# Patient Record
(demographics unavailable — no encounter records)

---

## 2025-01-10 NOTE — CARDIOLOGY SUMMARY
[Today's Date] : [unfilled] [FreeTextEntry1] : Sinus bradycardia, rate 57 bpm, QRS axis +82 degrees, MD 0.13, QRS 0.09, QTc 0.40 seconds; possible left ventricular hypertrophy. [FreeTextEntry2] : Summary:  1. Normal aortic valve morphology and systolic Doppler profile. 2. Trivial aortic valve regurgitation. 3. Normal left ventricular size, morphology and systolic function. 4. Normal right ventricular morphology with qualitatively normal size and systolic function. 5. No pericardial effusion. 6. There has been no significant interval change.  [de-identified] : Ordered [de-identified] : Lipid profile, LPA, CMP, CRP, homocysteine level, pulmonary consultation

## 2025-01-10 NOTE — ASSESSMENT
[FreeTextEntry1] : Andreas is a 12-year-old male, with 1-year hx of chest pain on exertion following COVID-19 infection. Pain has persisted only with exertion since patient last followed up in September 2024. DDx includes chest tightness secondary to exercised induced Asthma given patient hx of respiratory sx treated with Albuterol. Some cardiac etiologies of chest pain include pain secondary to inflammation, myositis, and cardiomyopathy. There is lower suspicion of cardiac etiology of pain given normal EKG's and Echos..

## 2025-01-10 NOTE — END OF VISIT
[Time Spent: ___ minutes] : I have spent [unfilled] minutes of time on the encounter which excludes teaching and separately reported services. [] : Resident [FreeTextEntry3] : Patient was seen with resident.  I performed a history and physical and discussion with family and resident.

## 2025-01-10 NOTE — PHYSICAL EXAM
[General Appearance - Alert] : alert [General Appearance - In No Acute Distress] : in no acute distress [General Appearance - Well Nourished] : well nourished [General Appearance - Well-Appearing] : well appearing [Attitude Uncooperative] : cooperative [Facies] : the head and face were normal in appearance [Sclera] : the conjunctiva were normal [EOMI] : ~T the extraocular movements were intact [PERRL With Normal Accommodation] : the pupils were equal in size, round, and reactive to light [Outer Ear] : the ears and nose were normal in appearance [Examination Of The Oral Cavity] : mucous membranes were moist and pink [Oropharynx] : the oropharynx was normal [No Cough] : no cough [Stridor] : no stridor was observed [Respiration, Rhythm And Depth] : normal respiratory rhythm and effort [Normal Chest Appearance] : the chest was normal in appearance [Chest Visual Inspection Thoracic Deformity] : no chest wall deformity [Chest Palpation Tender Sternum] : no chest wall tenderness [Heart Rate And Rhythm] : normal heart rate and rhythm [Heart Sounds] : normal S1 and S2 [No Murmur] : no murmurs  [Heart Sounds Gallop] : no gallops [Heart Sounds Pericardial Friction Rub] : no pericardial rub [Edema] : no edema [Arterial Pulses] : normal upper and lower extremity pulses with no pulse delay [Heart Sounds Click] : no clicks [Capillary Refill Test] : normal capillary refill [Bowel Sounds] : normal bowel sounds [Abdomen Soft] : soft [Nondistended] : nondistended [Abdomen Tenderness] : non-tender [Nail Clubbing] : no clubbing  or cyanosis of the fingernails [Musculoskeletal Exam: Normal Movement Of All Extremities] : normal movements of all extremities [Delayed Developmental Milestones] : normal neurologic development for age [Motor Tone] : normal muscle strength and tone [Abnormal Walk] : normal gait [] : no rash [Demonstrated Behavior - Infant Nonreactive To Parents] : interactive [Mood] : mood and affect were appropriate for age [Demonstrated Behavior] : normal behavior

## 2025-01-10 NOTE — DISCUSSION/SUMMARY
[May participate in all age-appropriate activities] : [unfilled] May participate in all age-appropriate activities. [Needs SBE Prophylaxis] : [unfilled] does not need bacterial endocarditis prophylaxis [FreeTextEntry1] : Blood work, exercise stress test with MVO 2; follow-up in 2 months as needed pulmonary consultation

## 2025-01-10 NOTE — CONSULT LETTER
[Today's Date] : [unfilled] [Dear  ___:] : Dear Dr. [unfilled]: [Name] : Name: [unfilled] [] : : ~~ [Today's Date:] : [unfilled] [Consult - Single Provider] : Thank you very much for allowing me to participate in the care of this patient. If you have any questions, please do not hesitate to contact me. [Sincerely,] : Sincerely, [___] : [unfilled] [FreeTextEntry4] : Dr Mccallum [FreeTextEntry5] : [address] [FreeTextEntry6] : [place] [address] [de-identified] : Jimmy Mcgrath MD, FAAP, FACC, FAHA Chief Emeritus, Division of Pediatric Cardiology [facility] Professor, Department of Pediatrics, [facility]

## 2025-02-08 NOTE — REASON FOR VISIT
[TextEntry] : Andreas Ariza is 12 years old male with history of seasonal allergies. From pulmonary and sleep perspective, he has history of exercise induced cough, shortness of breath, chest pain and dizziness. The patient is here for an initial evaluation. The history was obtained from the patient and father.

## 2025-02-08 NOTE — RESULTS/DATA
[TextEntry] : No chest imaging to review.   EK/10/2025. Sinus bradycardia, rate 57 bpm, QRS axis +82 degrees, IN 0.13, QRS 0.09, QTc 0.40 seconds; possible left ventricular hypertrophy.    Echo: 01/10/2025. Summary: 1. Normal aortic valve morphology and systolic Doppler profile. 2. Trivial aortic valve regurgitation. 3. Normal left ventricular size, morphology and systolic function. 4. Normal right ventricular morphology with qualitatively normal size and systolic function. 5. No pericardial effusion. 6. There has been no significant interval change.

## 2025-02-08 NOTE — IMPRESSION
[TextEntry] : Interpretation: Spirometry is normal. Flow volume loop revealed notch shoulder on the expiratory flow loop and flattening of inspiratory loop which could represent tracheomalacia and variable extrathoracic obstruction like vocal cord dysfunction, respectively. Bronchodilator response was not checked. Lung volumes, airway resistance and diffusion capacity are normal. There is no prior study to compare.

## 2025-02-08 NOTE — ASSESSMENT
[FreeTextEntry1] : Andreas Ariza is 12 years old male with history of seasonal allergies. From pulmonary and sleep perspective, he has history of exercise induced cough, shortness of breath, chest pain and dizziness.   The exact etiologyof his symptoms is unclear, could be exercise induced asthma. Howevre the flow volume loop revealed notch shoulder on the expiratory flow loop and flattening of inspiratory loop which could represent tracheomalacia and variable extrathoracic obstruction like vocal cord dysfunction, respectively.  Plan: I had discussed with the father. At this time, I would like to start pre activity albuterol for him. If he did not have a clinical response, I will discuss the need for an airway evaluation with the parents.  1. Start 2 puffs of albuterol 15-20 minutes before physical activity which will help to improve exercise compliance and will decrease the need for post activity albuterol. 2. I will order a chest x-ray. It is done on the 2nd floor of the [facility]. No appointment is needed. I will call you if it is abnormal.  3. Always use spacer with inhaler. MDI/spacer teaching was provided in the clinic.  4. I will see him in 3 months.

## 2025-02-08 NOTE — ASSESSMENT
[FreeTextEntry1] : Andreas Ariza is 12 years old male with history of seasonal allergies. From pulmonary and sleep perspective, he has history of exercise induced cough, shortness of breath, chest pain and dizziness.   The exact etiologyof his symptoms is unclear, could be exercise induced asthma. Howevre the flow volume loop revealed notch shoulder on the expiratory flow loop and flattening of inspiratory loop which could represent tracheomalacia and variable extrathoracic obstruction like vocal cord dysfunction, respectively.  Plan: I had discussed with the father. At this time, I would like to start pre activity albuterol for him. If he did not have a clinical response, I will discuss the need for an airway evaluation with the parents.  1. Start 2 puffs of albuterol 15-20 minutes before physical activity which will help to improve exercise compliance and will decrease the need for post activity albuterol. 2. I will order a chest x-ray. It is done on the 2nd floor of the [facility]. No appointment is needed. I will call you if it is abnormal.  3. Always use spacer with inhaler. MDI/spacer teaching was provided in the clinic.  4. I will see him in 3 months.

## 2025-02-08 NOTE — HISTORY OF PRESENT ILLNESS
[FreeTextEntry1] : Andreas has history of exercise induced chest pain, shortness of breath, cough and dizziness with the physical activity. He has these symptoms only when he does excessive physical activity. These symptoms are present for last 1 year and usually are not present with the regular sports. He denies any symptoms ta rest. He was recently seen by the cardiologist and he unlikely has cardiac etiology of his symptoms and his EKG and echo are normal.   RESPIRATORY HISTORY: There is no history of any pulmonary symptoms at birth. He has never been diagnosed with asthma. There is no history of feeding difficulties including cough or choking with feeding. There is no history of noisy breathing . There is no history of recurrent pneumonias.  ALLERGY SYMPTOMS: There is history of nasal and ocular allergy symptoms. There is history of itchy or watery eyes, itchy or watery nose, and chronic nasal congestion. The symptoms are more prominent during spring. The patient does use any antiallergic and/or nasal steroids.  SLEEP SYMPTOMS: There is no history of loud snoring, respiratory pauses during sleep, restless sleep, sleep onset and maintenance insomnia. The patient did not have a sleep study.  OTHER SYMPTOMS: The patient is feeding, growing and developing well. There is no history of reflux/GERD. There is no history of, FTT, developmental delay, seizure or neurological disorder.  NEONATOLOGY HISTORY: The patient was born at 39 weeks via . The patient's birth weight was 7lb. There were no complications during pregnancy and after delivery.  PAST MEDICAL & SURGICAL HISTROY: There is no history of adenotonsillectomy or any other surgery.  FAMILY HISTORY: There is no family history of asthma, eczema and severe allergies. There is no family history of DENISE, RLS and narcolepsy.  SOCIAL HISTORY: The patient lives with the parents and sibling. There is no smoke or pet exposure at home.

## 2025-02-08 NOTE — RESULTS/DATA
[TextEntry] : No chest imaging to review.   EK/10/2025. Sinus bradycardia, rate 57 bpm, QRS axis +82 degrees, IL 0.13, QRS 0.09, QTc 0.40 seconds; possible left ventricular hypertrophy.    Echo: 01/10/2025. Summary: 1. Normal aortic valve morphology and systolic Doppler profile. 2. Trivial aortic valve regurgitation. 3. Normal left ventricular size, morphology and systolic function. 4. Normal right ventricular morphology with qualitatively normal size and systolic function. 5. No pericardial effusion. 6. There has been no significant interval change.

## 2025-02-08 NOTE — PHYSICAL EXAM
[TextEntry] :  General: Well-developed, well-nourished, no acute distress, well-appearing, active and playful. Eyes: Extra-ocular movements intact, conjunctiva clear. Head: normocephalic, atraumatic. Ear: Right ear: Normal external ear, non-erythematous tympanic membrane Left ear: Normal external ear, non-erythematous tympanic membrane Nose: Right nose: Nasal cavity is patent, normal nasal mucosa, turbinates are not enlarged Left nose: Nasal cavity is patent, Normal nasal mucosa, turbinates are not enlarged Throat: Oral mucous membranes moist and pink, no oral lesions, normal dentition and gingiva, tonsils 1+ BL, Mallampati class IV. Neck: Supple, trachea midline, no masses. Cardiovascular: Regular rate and rhythm, no murmurs appreciated; capillary refill < 2 second; 2+ radial pulses bilaterally; no edema. Respiratory: No deformities of the chest, symmetric chest rise, breathing non-labored, no retractions, no nasal flaring, no tracheal tug, lungs clear to auscultation BL, good aeration BL through all lung fields, no crackles, no wheezes, no upper airway transmitted sounds. GI: Abdomen soft, nontender, nondistended, normal bowel sounds, no masses, no organomegaly. Lymph: No cervical lymphadenopathy appreciated. Musculoskeletal: Normal muscle tone and strength. Extremities: Warm, well-perfused, no digital clubbing or cyanosis. Skin: Warm, dry, no rashes. Neurology: Awake, alert, and interactive, normal affect, developmentally appropriate.

## 2025-04-29 NOTE — CARDIOLOGY SUMMARY
[Today's Date] : [unfilled] [FreeTextEntry1] : Sinus bradycardia, rate 54 bpm, QRS axis +82 degrees, TN 0.12, QRS 0.09, QTc 0.4 seconds with possible left ventricular hypertrophy.

## 2025-04-29 NOTE — REASON FOR VISIT
[Follow-Up] : a follow-up visit for [Chest Pain] : chest pain [Patient] : patient [Father] : father [FreeTextEntry3] : sports clearance

## 2025-04-29 NOTE — CONSULT LETTER
[Today's Date] : [unfilled] [Name] : Name: [unfilled] [] : : ~~ [Today's Date:] : [unfilled] [Dear  ___:] : Dear Dr. [unfilled]: [Consult - Single Provider] : Thank you very much for allowing me to participate in the care of this patient. If you have any questions, please do not hesitate to contact me. [Sincerely,] : Sincerely, [___] : [unfilled] [FreeTextEntry4] : Dr. Lizzeth Ryan MD [FreeTextEntry5] : [id number], [place] [address] [FreeTextEntry6] : [phone] [de-identified] : Jimmy Mcgrath MD, FAAP, FACC, FAHA Chief Emeritus, Division of Pediatric Cardiology [facility] Professor, Department of Pediatrics, [facility]